# Patient Record
Sex: FEMALE | Race: WHITE | NOT HISPANIC OR LATINO | Employment: OTHER | URBAN - METROPOLITAN AREA
[De-identification: names, ages, dates, MRNs, and addresses within clinical notes are randomized per-mention and may not be internally consistent; named-entity substitution may affect disease eponyms.]

---

## 2018-01-11 ENCOUNTER — SURGERY - HEALTHEAST (OUTPATIENT)
Dept: SURGERY | Facility: HOSPITAL | Age: 73
End: 2018-01-11

## 2018-01-11 ENCOUNTER — ANESTHESIA - HEALTHEAST (OUTPATIENT)
Dept: SURGERY | Facility: HOSPITAL | Age: 73
End: 2018-01-11

## 2018-01-11 ASSESSMENT — MIFFLIN-ST. JEOR
SCORE: 1329.82

## 2018-01-15 ENCOUNTER — RECORDS - HEALTHEAST (OUTPATIENT)
Dept: LAB | Facility: CLINIC | Age: 73
End: 2018-01-15

## 2018-01-15 LAB
ANION GAP SERPL CALCULATED.3IONS-SCNC: 10 MMOL/L (ref 5–18)
BUN SERPL-MCNC: 11 MG/DL (ref 8–28)
CALCIUM SERPL-MCNC: 9.5 MG/DL (ref 8.5–10.5)
CHLORIDE BLD-SCNC: 102 MMOL/L (ref 98–107)
CO2 SERPL-SCNC: 29 MMOL/L (ref 22–31)
CREAT SERPL-MCNC: 0.7 MG/DL (ref 0.6–1.1)
GFR SERPL CREATININE-BSD FRML MDRD: >60 ML/MIN/1.73M2
GLUCOSE BLD-MCNC: 73 MG/DL (ref 70–125)
POTASSIUM BLD-SCNC: 4.8 MMOL/L (ref 3.5–5)
SODIUM SERPL-SCNC: 141 MMOL/L (ref 136–145)

## 2018-01-30 ENCOUNTER — RECORDS - HEALTHEAST (OUTPATIENT)
Dept: LAB | Facility: CLINIC | Age: 73
End: 2018-01-30

## 2018-01-30 LAB
ANION GAP SERPL CALCULATED.3IONS-SCNC: 11 MMOL/L (ref 5–18)
BUN SERPL-MCNC: 15 MG/DL (ref 8–28)
CALCIUM SERPL-MCNC: 9.6 MG/DL (ref 8.5–10.5)
CHLORIDE BLD-SCNC: 104 MMOL/L (ref 98–107)
CO2 SERPL-SCNC: 27 MMOL/L (ref 22–31)
CREAT SERPL-MCNC: 0.83 MG/DL (ref 0.6–1.1)
GFR SERPL CREATININE-BSD FRML MDRD: >60 ML/MIN/1.73M2
GLUCOSE BLD-MCNC: 102 MG/DL (ref 70–125)
POTASSIUM BLD-SCNC: 4.4 MMOL/L (ref 3.5–5)
SODIUM SERPL-SCNC: 142 MMOL/L (ref 136–145)

## 2018-04-05 ENCOUNTER — RECORDS - HEALTHEAST (OUTPATIENT)
Dept: LAB | Facility: CLINIC | Age: 73
End: 2018-04-05

## 2018-04-05 LAB
ANION GAP SERPL CALCULATED.3IONS-SCNC: 14 MMOL/L (ref 5–18)
BUN SERPL-MCNC: 17 MG/DL (ref 8–28)
CALCIUM SERPL-MCNC: 9.9 MG/DL (ref 8.5–10.5)
CHLORIDE BLD-SCNC: 105 MMOL/L (ref 98–107)
CO2 SERPL-SCNC: 23 MMOL/L (ref 22–31)
CREAT SERPL-MCNC: 0.79 MG/DL (ref 0.6–1.1)
GFR SERPL CREATININE-BSD FRML MDRD: >60 ML/MIN/1.73M2
GLUCOSE BLD-MCNC: 78 MG/DL (ref 70–125)
POTASSIUM BLD-SCNC: 4.9 MMOL/L (ref 3.5–5)
SODIUM SERPL-SCNC: 142 MMOL/L (ref 136–145)

## 2018-04-13 ENCOUNTER — SURGERY - HEALTHEAST (OUTPATIENT)
Dept: SURGERY | Facility: HOSPITAL | Age: 73
End: 2018-04-13

## 2018-04-13 ENCOUNTER — ANESTHESIA - HEALTHEAST (OUTPATIENT)
Dept: SURGERY | Facility: HOSPITAL | Age: 73
End: 2018-04-13

## 2018-04-13 ASSESSMENT — MIFFLIN-ST. JEOR: SCORE: 1334.35

## 2018-05-01 ENCOUNTER — RECORDS - HEALTHEAST (OUTPATIENT)
Dept: LAB | Facility: CLINIC | Age: 73
End: 2018-05-01

## 2018-05-01 LAB — CANCER AG125 SERPL-ACNC: 40.3 U/ML (ref 0–35)

## 2018-05-09 ENCOUNTER — SURGERY - HEALTHEAST (OUTPATIENT)
Dept: GASTROENTEROLOGY | Facility: CLINIC | Age: 73
End: 2018-05-09

## 2018-05-09 ASSESSMENT — MIFFLIN-ST. JEOR: SCORE: 1338.89

## 2018-06-04 ENCOUNTER — RECORDS - HEALTHEAST (OUTPATIENT)
Dept: LAB | Facility: CLINIC | Age: 73
End: 2018-06-04

## 2018-06-05 LAB — CANCER AG125 SERPL-ACNC: 24.5 U/ML (ref 0–35)

## 2018-07-03 ASSESSMENT — MIFFLIN-ST. JEOR: SCORE: 1352.5

## 2018-07-04 ENCOUNTER — ANESTHESIA - HEALTHEAST (OUTPATIENT)
Dept: SURGERY | Facility: HOSPITAL | Age: 73
End: 2018-07-04

## 2018-07-05 ENCOUNTER — SURGERY - HEALTHEAST (OUTPATIENT)
Dept: SURGERY | Facility: HOSPITAL | Age: 73
End: 2018-07-05

## 2018-07-05 ASSESSMENT — MIFFLIN-ST. JEOR: SCORE: 1365.89

## 2018-07-06 ASSESSMENT — MIFFLIN-ST. JEOR: SCORE: 1404.44

## 2018-08-02 ENCOUNTER — RECORDS - HEALTHEAST (OUTPATIENT)
Dept: LAB | Facility: CLINIC | Age: 73
End: 2018-08-02

## 2018-08-02 LAB — CEA SERPL-MCNC: 2.3 NG/ML (ref 0–3)

## 2018-09-10 ENCOUNTER — RECORDS - HEALTHEAST (OUTPATIENT)
Dept: LAB | Facility: CLINIC | Age: 73
End: 2018-09-10

## 2018-09-10 LAB
ALBUMIN SERPL-MCNC: 3.2 G/DL (ref 3.5–5)
ANION GAP SERPL CALCULATED.3IONS-SCNC: 13 MMOL/L (ref 5–18)
BUN SERPL-MCNC: 18 MG/DL (ref 8–28)
CALCIUM SERPL-MCNC: 9.7 MG/DL (ref 8.5–10.5)
CHLORIDE BLD-SCNC: 104 MMOL/L (ref 98–107)
CO2 SERPL-SCNC: 21 MMOL/L (ref 22–31)
CREAT SERPL-MCNC: 0.77 MG/DL (ref 0.6–1.1)
GFR SERPL CREATININE-BSD FRML MDRD: >60 ML/MIN/1.73M2
GLUCOSE BLD-MCNC: 85 MG/DL (ref 70–125)
PHOSPHATE SERPL-MCNC: 3.4 MG/DL (ref 2.5–4.5)
POTASSIUM BLD-SCNC: 5.4 MMOL/L (ref 3.5–5)
SODIUM SERPL-SCNC: 138 MMOL/L (ref 136–145)

## 2019-02-07 ENCOUNTER — RECORDS - HEALTHEAST (OUTPATIENT)
Dept: LAB | Facility: CLINIC | Age: 74
End: 2019-02-07

## 2019-02-07 LAB
ANION GAP SERPL CALCULATED.3IONS-SCNC: 10 MMOL/L (ref 5–18)
BUN SERPL-MCNC: 19 MG/DL (ref 8–28)
CALCIUM SERPL-MCNC: 9.8 MG/DL (ref 8.5–10.5)
CHLORIDE BLD-SCNC: 107 MMOL/L (ref 98–107)
CHOLEST SERPL-MCNC: 216 MG/DL
CO2 SERPL-SCNC: 26 MMOL/L (ref 22–31)
CREAT SERPL-MCNC: 0.83 MG/DL (ref 0.6–1.1)
FASTING STATUS PATIENT QL REPORTED: ABNORMAL
GFR SERPL CREATININE-BSD FRML MDRD: >60 ML/MIN/1.73M2
GLUCOSE BLD-MCNC: 85 MG/DL (ref 70–125)
HDLC SERPL-MCNC: 75 MG/DL
LDLC SERPL CALC-MCNC: 95 MG/DL
POTASSIUM BLD-SCNC: 4.9 MMOL/L (ref 3.5–5)
SODIUM SERPL-SCNC: 143 MMOL/L (ref 136–145)
TRIGL SERPL-MCNC: 230 MG/DL

## 2019-04-09 ENCOUNTER — RECORDS - HEALTHEAST (OUTPATIENT)
Dept: LAB | Facility: CLINIC | Age: 74
End: 2019-04-09

## 2019-04-12 LAB — BACTERIA SPEC CULT: ABNORMAL

## 2020-10-08 PROBLEM — E66.9 OBESITY: Status: ACTIVE | Noted: 2020-10-08

## 2020-10-08 PROBLEM — G43.109 MIGRAINE WITH AURA: Status: ACTIVE | Noted: 2020-10-08

## 2020-10-08 PROBLEM — R55 SYNCOPE: Status: ACTIVE | Noted: 2020-10-08

## 2020-10-08 PROBLEM — H53.15 METAMORPHOPSIA: Status: ACTIVE | Noted: 2020-10-08

## 2021-05-31 VITALS — BODY MASS INDEX: 26.37 KG/M2 | HEIGHT: 68 IN | WEIGHT: 174 LBS

## 2021-06-01 VITALS — HEIGHT: 68 IN | WEIGHT: 176 LBS | BODY MASS INDEX: 26.67 KG/M2

## 2021-06-01 VITALS — HEIGHT: 68 IN | BODY MASS INDEX: 26.52 KG/M2 | WEIGHT: 175 LBS

## 2021-06-01 VITALS — WEIGHT: 192.2 LBS | BODY MASS INDEX: 30.17 KG/M2 | HEIGHT: 67 IN

## 2021-06-02 ENCOUNTER — RECORDS - HEALTHEAST (OUTPATIENT)
Dept: ADMINISTRATIVE | Facility: CLINIC | Age: 76
End: 2021-06-02

## 2021-06-15 NOTE — ANESTHESIA PREPROCEDURE EVALUATION
Anesthesia Evaluation      Patient summary reviewed     Airway   Mallampati: II  Neck ROM: full   Pulmonary - negative ROS and normal exam                          Cardiovascular - negative ROS and normal exam   Neuro/Psych - negative ROS     Endo/Other - negative ROS      GI/Hepatic/Renal - negative ROS   (+) GERD intermittent,             Dental    (+) caps    Comment: L upper                       Anesthesia Plan  Planned anesthetic: general endotracheal    ASA 2   Induction: intravenous   Anesthetic plan and risks discussed with: patient    Post-op plan: routine recovery

## 2021-06-15 NOTE — ANESTHESIA CARE TRANSFER NOTE
Last vitals:   Vitals:    01/11/18 0840   BP: 155/89   Pulse: (!) 109   Resp: 16   Temp: 37.1  C (98.7  F)   SpO2: 98%     Patient's level of consciousness is drowsy  Spontaneous respirations: yes  Maintains airway independently: yes  Dentition unchanged: yes  Oropharynx: oropharynx clear of all foreign objects    QCDR Measures:  ASA# 20 - Surgical Safety Checklist: WHO surgical safety checklist completed prior to induction  PQRS# 430 - Adult PONV Prevention: 4558F - Pt received => 2 anti-emetic agents (different classes) preop & intraop  ASA# 8 - Peds PONV Prevention: NA - Not pediatric patient, not GA or 2 or more risk factors NOT present  PQRS# 424 - Amina-op Temp Management: 4559F - At least one body temp DOCUMENTED => 35.5C or 95.9F within required timeframe  PQRS# 426 - PACU Transfer Protocol: - Transfer of care checklist used  ASA# 14 - Acute Post-op Pain: ASA14B - Patient did NOT experience pain >= 7 out of 10.  Patient denied pain upon arrival to PACU.

## 2021-06-15 NOTE — ANESTHESIA POSTPROCEDURE EVALUATION
Patient: Natalie Onofre  APPENDECTOMY, LAPAROSCOPIC  Anesthesia type: general    Patient location:       Last vitals:   Vitals:    01/11/18 1026   BP: 133/59   Pulse: 76   Resp: 12   Temp: 36.5  C (97.7  F)   SpO2: 96%     Post vital signs: stable  Level of consciousness: awake and responds to simple questions  Post-anesthesia pain: pain controlled  Post-anesthesia nausea and vomiting: no  Pulmonary: unassisted, return to baseline  Cardiovascular: stable and blood pressure at baseline  Hydration: adequate  Anesthetic events: no    QCDR Measures:  ASA# 11 - Amina-op Cardiac Arrest: ASA11B - Patient did NOT experience unanticipated cardiac arrest  ASA# 12 - Amina-op Mortality Rate: ASA12B - Patient did NOT die  ASA# 13 - PACU Re-Intubation Rate: ASA13B - Patient did NOT require a new airway mgmt  ASA# 10 - Composite Anes Safety: ASA10A - No serious adverse event    Additional Notes:

## 2021-06-17 NOTE — ANESTHESIA POSTPROCEDURE EVALUATION
Patient: Natalie Onofre  LAPAROSCOPY RIGHT SALPINGO-OOPHERECTOMY, HYSTEROSCOPY, DILATION AND CURETTAGE  Anesthesia type: general    Patient location: PACU  Last vitals:   Vitals:    04/13/18 1355   BP: 154/86   Pulse: 96   Resp: 16   Temp: 36.7  C (98.1  F)   SpO2: 100%     Post vital signs: stable  Level of consciousness: awake and responds to simple questions  Post-anesthesia pain: pain controlled  Post-anesthesia nausea and vomiting: no  Pulmonary: unassisted, return to baseline  Cardiovascular: stable and blood pressure at baseline  Hydration: adequate  Anesthetic events: no    QCDR Measures:  ASA# 11 - Amina-op Cardiac Arrest: ASA11B - Patient did NOT experience unanticipated cardiac arrest  ASA# 12 - Amina-op Mortality Rate: ASA12B - Patient did NOT die  ASA# 13 - PACU Re-Intubation Rate: ASA13B - Patient did NOT require a new airway mgmt  ASA# 10 - Composite Anes Safety: ASA10A - No serious adverse event    Additional Notes:

## 2021-06-17 NOTE — ANESTHESIA PREPROCEDURE EVALUATION
Anesthesia Evaluation      Patient summary reviewed   No history of anesthetic complications     Airway   Mallampati: II  Neck ROM: full   Pulmonary - normal exam    breath sounds clear to auscultation  (-) shortness of breath, sleep apnea, not a smoker                         Cardiovascular   Exercise tolerance: > or = 4 METS  (+) , hypercholesterolemia,     (-) angina, murmur  ECG reviewed  Rhythm: regular  Rate: normal,    no murmur      Neuro/Psych    (+) depression, anxiety/panic attacks,     Comments: Post concussion syndrome    Endo/Other    (+) obesity,   (-) not pregnant     GI/Hepatic/Renal    (+) GERD,             Dental - normal exam   (+) caps                       Anesthesia Plan  Planned anesthetic: general endotracheal    ASA 2   Induction: intravenous   Anesthetic plan and risks discussed with: patient and spouse  Anesthesia plan special considerations: antiemetics,   Post-op plan: routine recovery

## 2021-06-17 NOTE — ANESTHESIA CARE TRANSFER NOTE
Last vitals:   Vitals:    04/13/18 1355   BP: 154/86   Pulse: 96   Resp: 16   Temp: 36.7  C (98.1  F)   SpO2: 100%   In OR, spont respir, , REDDY, sustained head lift, sx and extubated to 02 via FM, spont respir, transported to PACU, VSS, report to RN.   Patient's level of consciousness is drowsy  Spontaneous respirations: yes  Maintains airway independently: yes  Dentition unchanged: yes  Oropharynx: oropharynx clear of all foreign objects    QCDR Measures:  ASA# 20 - Surgical Safety Checklist: WHO surgical safety checklist completed prior to induction  PQRS# 430 - Adult PONV Prevention: 4558F - Pt received => 2 anti-emetic agents (different classes) preop & intraop  ASA# 8 - Peds PONV Prevention: NA - Not pediatric patient, not GA or 2 or more risk factors NOT present  PQRS# 424 - Amina-op Temp Management: 4559F - At least one body temp DOCUMENTED => 35.5C or 95.9F within required timeframe  PQRS# 426 - PACU Transfer Protocol: - Transfer of care checklist used  ASA# 14 - Acute Post-op Pain: ASA14B - Patient did NOT experience pain >= 7 out of 10

## 2021-06-19 NOTE — ANESTHESIA PREPROCEDURE EVALUATION
"Anesthesia Evaluation      Patient summary reviewed   History of anesthetic complications (Hx dizziness with anesthesia.)     Airway   Mallampati: II  Neck ROM: full   Pulmonary - normal exam     ROS comment: Seasonal allergies                         Cardiovascular - negative ROS and normal exam  Exercise tolerance: > or = 4 METS  (+) , hypercholesterolemia,     ECG reviewed        Neuro/Psych    (+) depression, anxiety/panic attacks, chronic pain (Chronic neck pain)  (-) no seizures, no CVA    Comments: Migaine HA's    Endo/Other    (-) no diabetes     Comments: Osteopenia    GI/Hepatic/Renal    (+) GERD,     (-) impaired hepatic function, renal disease     Other findings: Hgb 12.6      S/p concussion with post-concussion syndrome 2 years ago. Intermittent dizziness. Double vision essentially resolved. Patient is on vision therapy. Balance issues.      Dental    (+) caps    Comment: Cap top front tooth                       Anesthesia Plan  Planned anesthetic: general endotracheal  Soft molar bite block to protect upper front cap  Scopolamine patch  Acetaminophen 1 Gm IVPB intraop  Minimize narcotics  Glidescope intubation for chronic neck pain  Modified RSI with Zemuron  Propofol 50 mcg/kg/min IV with Sevo.  Zofran/Decadron  Toradol 15 mg IV if \"ok\" with Dr. Man  Sugammadex reversal  ASA 3   Induction: intravenous   Anesthetic plan and risks discussed with: patient and spouse  Anesthesia plan special considerations: video-assisted, rapid sequence induction, antiemetics,   Post-op plan: routine recovery          "

## 2021-06-19 NOTE — ANESTHESIA POSTPROCEDURE EVALUATION
Patient: Natalie Onofre  LAPAROSCOPY LEFT SALPINGO OOPHORECTOMY   Anesthesia type: general    Patient location: PACU  Last vitals:   Vitals:    07/05/18 1230   BP: 133/76   Pulse: 84   Resp: 20   Temp: 36.8  C (98.3  F)   SpO2: 94%     Post vital signs: stable  Level of consciousness: awake and responds to simple questions  Post-anesthesia pain: pain controlled  Post-anesthesia nausea and vomiting: no  Pulmonary: unassisted, return to baseline  Cardiovascular: stable and blood pressure at baseline  Hydration: adequate  Anesthetic events: no    QCDR Measures:  ASA# 11 - Amina-op Cardiac Arrest: ASA11B - Patient did NOT experience unanticipated cardiac arrest  ASA# 12 - Amina-op Mortality Rate: ASA12B - Patient did NOT die  ASA# 13 - PACU Re-Intubation Rate: ASA13B - Patient did NOT require a new airway mgmt  ASA# 10 - Composite Anes Safety: ASA10A - No serious adverse event    Additional Notes:

## 2021-06-19 NOTE — ANESTHESIA CARE TRANSFER NOTE
Last vitals:   Vitals:    07/05/18 0924   BP: (!) 145/91   Pulse: (!) 103   Resp: 12   Temp: 36.3  C (97.4  F)   SpO2: 100%     Patient's level of consciousness is drowsy  Spontaneous respirations: yes  Maintains airway independently: yes  Dentition unchanged: yes  Oropharynx: oropharynx clear of all foreign objects    QCDR Measures:  ASA# 20 - Surgical Safety Checklist: WHO surgical safety checklist completed prior to induction  PQRS# 430 - Adult PONV Prevention: 4558F - Pt received => 2 anti-emetic agents (different classes) preop & intraop  ASA# 8 - Peds PONV Prevention: NA - Not pediatric patient, not GA or 2 or more risk factors NOT present  PQRS# 424 - Amina-op Temp Management: 4559F - At least one body temp DOCUMENTED => 35.5C or 95.9F within required timeframe  PQRS# 426 - PACU Transfer Protocol: - Transfer of care checklist used  ASA# 14 - Acute Post-op Pain: ASA14B - Patient did NOT experience pain >= 7 out of 10

## 2021-07-19 ENCOUNTER — LAB REQUISITION (OUTPATIENT)
Dept: LAB | Facility: CLINIC | Age: 76
End: 2021-07-19
Payer: COMMERCIAL

## 2021-07-19 DIAGNOSIS — E78.2 MIXED HYPERLIPIDEMIA: ICD-10-CM

## 2021-07-19 LAB
CHOLEST SERPL-MCNC: 205 MG/DL
FASTING STATUS PATIENT QL REPORTED: ABNORMAL
HDLC SERPL-MCNC: 83 MG/DL
LDLC SERPL CALC-MCNC: 78 MG/DL
TRIGL SERPL-MCNC: 219 MG/DL

## 2021-07-19 PROCEDURE — 80061 LIPID PANEL: CPT | Mod: ORL | Performed by: FAMILY MEDICINE

## 2021-09-01 DIAGNOSIS — G43.109 MIGRAINE WITH AURA AND WITHOUT STATUS MIGRAINOSUS, NOT INTRACTABLE: ICD-10-CM

## 2021-09-01 RX ORDER — DIVALPROEX SODIUM 250 MG/1
250 TABLET, EXTENDED RELEASE ORAL AT BEDTIME
Qty: 30 TABLET | Refills: 0 | Status: SHIPPED | OUTPATIENT
Start: 2021-09-01 | End: 2021-09-21

## 2021-09-01 NOTE — LETTER
9/1/2021        RE: Natalie Onofre  [address] [address]              Dear Natalie,          We recently provided you with medication refills.  Many medications require routine follow-up with your doctor.    Your prescription(s) have been refilled for 30 days so you may have time for the above noted follow-up. Please call to schedule soon so we can assure you have an appointment before your next refills are needed. If you have already made a follow up appointment, please disregard this letter.           Sincerely,        [facility]     (Formerly known as [facility])  Dr. Duran Care team

## 2021-09-01 NOTE — TELEPHONE ENCOUNTER
Refill request for divalproex.  Last seen 5/7/2019  Letter mailed to pt reminding to schedule.  Medication T'd for review and signature    Shabana Arce LPN on 9/1/2021 at 1:44 PM

## 2021-09-21 RX ORDER — DIVALPROEX SODIUM 250 MG/1
250 TABLET, EXTENDED RELEASE ORAL AT BEDTIME
Qty: 90 TABLET | Refills: 1 | Status: SHIPPED | OUTPATIENT
Start: 2021-09-21 | End: 2022-01-11

## 2021-09-21 NOTE — TELEPHONE ENCOUNTER
Refill request for divalproex.  Pt has upcoming appt on 1/11/22.  Medication T'd for review and signature    Shabana Arce LPN on 9/21/2021 at 12:03 PM

## 2021-09-21 NOTE — TELEPHONE ENCOUNTER
Left message for pt that refill was sent to the pharmacy.    Shabana Arce LPN on 9/21/2021 at 3:40 PM

## 2021-09-21 NOTE — TELEPHONE ENCOUNTER
Gaby did schedule for January 2022 with . So she will need refills of her Depakote until then. Uses [facility] in [place].  Also she wanted  to know she has missed the last few appointments or has not been in lately due to vision issues she has been having and is having surgery for that mid October. No need to call her back she just wanted us to know why.

## 2022-01-11 ENCOUNTER — OFFICE VISIT (OUTPATIENT)
Dept: NEUROLOGY | Facility: CLINIC | Age: 77
End: 2022-01-11
Payer: MEDICARE

## 2022-01-11 VITALS
HEART RATE: 85 BPM | BODY MASS INDEX: 26.67 KG/M2 | DIASTOLIC BLOOD PRESSURE: 65 MMHG | HEIGHT: 68 IN | SYSTOLIC BLOOD PRESSURE: 107 MMHG | WEIGHT: 176 LBS

## 2022-01-11 DIAGNOSIS — G43.109 MIGRAINE WITH AURA AND WITHOUT STATUS MIGRAINOSUS, NOT INTRACTABLE: Primary | ICD-10-CM

## 2022-01-11 PROCEDURE — 99215 OFFICE O/P EST HI 40 MIN: CPT | Performed by: PSYCHIATRY & NEUROLOGY

## 2022-01-11 RX ORDER — DIVALPROEX SODIUM 250 MG/1
250 TABLET, EXTENDED RELEASE ORAL AT BEDTIME
Qty: 90 TABLET | Refills: 3 | Status: SHIPPED | OUTPATIENT
Start: 2022-01-11 | End: 2023-01-12

## 2022-01-11 RX ORDER — DULOXETIN HYDROCHLORIDE 30 MG/1
1 CAPSULE, DELAYED RELEASE ORAL DAILY
COMMUNITY

## 2022-01-11 RX ORDER — ATORVASTATIN CALCIUM 40 MG/1
TABLET, FILM COATED ORAL
COMMUNITY

## 2022-01-11 RX ORDER — SUMATRIPTAN 100 MG/1
100 TABLET, FILM COATED ORAL PRN
COMMUNITY
End: 2024-03-04

## 2022-01-11 RX ORDER — ASPIRIN 325 MG
TABLET ORAL
COMMUNITY
End: 2024-03-04

## 2022-01-11 RX ORDER — CHOLECALCIFEROL (VITAMIN D3) 25 MCG
CAPSULE ORAL
COMMUNITY

## 2022-01-11 RX ORDER — MIRTAZAPINE 15 MG/1
15 TABLET, FILM COATED ORAL AT BEDTIME
COMMUNITY

## 2022-01-11 RX ORDER — PANTOPRAZOLE SODIUM 20 MG/1
20 TABLET, DELAYED RELEASE ORAL
COMMUNITY

## 2022-01-11 RX ORDER — MULTIVITAMIN,THERAPEUTIC
1 TABLET ORAL DAILY
COMMUNITY

## 2022-01-11 RX ORDER — CETIRIZINE HYDROCHLORIDE 10 MG/1
10 TABLET ORAL
COMMUNITY

## 2022-01-11 RX ORDER — FLUTICASONE PROPIONATE 50 MCG
1 SPRAY, SUSPENSION (ML) NASAL
COMMUNITY

## 2022-01-11 RX ORDER — DULOXETIN HYDROCHLORIDE 60 MG/1
1 CAPSULE, DELAYED RELEASE ORAL DAILY
COMMUNITY

## 2022-01-11 ASSESSMENT — MIFFLIN-ST. JEOR: SCORE: 1333.89

## 2022-01-11 NOTE — LETTER
1/11/2022         RE: Natalie Onofre  [address] [address]        Dear Colleague,    Thank you for referring your patient, Natalie Onofre, to the [facility] [place]. Please see a copy of my visit note below.    In person evaluation      HPI  5/7/2019, in person visit  1/11/2022, in person visit    76-year-old evaluated neurologically for:  Migraine headaches    Last seen almost 3 years ago  No major hospitalizations  No illnesses  No COVID  COVID-vaccine plus booster  Did get some laser treatment of her eyes due to a film that presented after her cataract surgery        A.  Migraine headaches       Been treated since July 1994       Patient does have past history of motion sickness when she was young       History of neck pain and headaches prior to the severe ones that brought her in for diagnosis       Can be aggravated by some insomnia and chronic neck pain         Frequency of headache about 3/month       Duration 3-4 hours       Better with 1 tablet of Excedrin Migraine uses sparingly    B.  Past history with concussion after fall April 2016       Fall April 2016 positive loss of consciousness difficulty with vision after the event      C.   Chronic neck pain         No radicular component         No new bowel or bladder difficulty         No weakness in the arms or legs or ataxia        Past medical history  Migraine headaches  Hyperlipidemia  Chronic neck pain  Osteopenia  Insomnia  Small bowel obstruction September 2018  GERD/esophagitis  Status post total hysterectomy  Seasonal allergies  Concussion April 2016 positive loss of consciousness visual changes   Multiple surgeries 2018  1. Appendectomy, maybe in January 2018.  2. April 2018, right ovary removed.  3. July 2018, left ovary removed.  4. September 2018, hysterectomy, all done at the [facility], possibly with some cancer.        Habits  Does not smoke      Family history  Adopted  Does not  know who her older sister is and she does not have migraines    Work-up review  EEG in the past normal.  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  TSH 2.21 and a B12 of 731.  Past HDL of 73, LDL of 91, sedimentation rate unremarkable in 2014 at 27, repeated was 28.      Review of systems  Headaches as above    No diplopia dysarthria dysphagia  No visual changes    No focal weakness numbness or tingling  No ataxia  No bowel or bladder difficulty    Does have neck pain  No radicular component    No chest pain or shortness of breath no nausea vomiting diarrhea fever chills    Otherwise review of systems good      General exam  Alert orient x3  Blood pressure 107/65, pulse 85  Lungs clear  Heart rate regular  Abdomen soft  Symmetrical pulses  No edema the feet    Cranial nerves II through XII normal  No ophthalmoplegia  No nystagmus  Visual fields intact  Face symmetrical  Tongue twisters good    Upper extremities  No drift no tremor    Lower extremities  Distal proximal strength good    Gait  Normal    Reflexes decreased throughout symmetrical  No spasticity      Assessment/plan    1.   Migraine headaches        Depakote 250 mg once at nighttime        Doing well        Discussed good headache hygiene        Abortive therapy Excedrin 1 tablet does not overuse    2.  Chronic neck pain       No myelopathic changes       Worse in the morning sometimes better after she gets up and moves around    3.  Past history of concussion get some vestibular/visual therapy still    4.  Cataract surgery had secondary film develop on her eyes and laser surgery fall 2021      We will check some laboratory data to make sure there are side effects of medication  AST  Electrolytes  CBC with platelets    Follow-up at least on a yearly basis  Continue med as it has been helping a lot    Reevaluation last seen almost 3 years ago  Total care time today 42  minutes evaluating discussing the above.                  Again, thank you for allowing me to participate in the care of your patient.        Sincerely,        Justice Duran MD

## 2022-01-11 NOTE — PROGRESS NOTES
In person evaluation      HPI  5/7/2019, in person visit  1/11/2022, in person visit    76-year-old evaluated neurologically for:  Migraine headaches    Last seen almost 3 years ago  No major hospitalizations  No illnesses  No COVID  COVID-vaccine plus booster  Did get some laser treatment of her eyes due to a film that presented after her cataract surgery        A.  Migraine headaches       Been treated since July 1994       Patient does have past history of motion sickness when she was young       History of neck pain and headaches prior to the severe ones that brought her in for diagnosis       Can be aggravated by some insomnia and chronic neck pain         Frequency of headache about 3/month       Duration 3-4 hours       Better with 1 tablet of Excedrin Migraine uses sparingly    B.  Past history with concussion after fall April 2016       Fall April 2016 positive loss of consciousness difficulty with vision after the event      C.   Chronic neck pain         No radicular component         No new bowel or bladder difficulty         No weakness in the arms or legs or ataxia        Past medical history  Migraine headaches  Hyperlipidemia  Chronic neck pain  Osteopenia  Insomnia  Small bowel obstruction September 2018  GERD/esophagitis  Status post total hysterectomy  Seasonal allergies  Concussion April 2016 positive loss of consciousness visual changes   Multiple surgeries 2018  1. Appendectomy, maybe in January 2018.  2. April 2018, right ovary removed.  3. July 2018, left ovary removed.  4. September 2018, hysterectomy, all done at the [facility], possibly with some cancer.        Habits  Does not smoke      Family history  Adopted  Does not know who her older sister is and she does not have migraines    Work-up review  EEG in the past normal.  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  TSH 2.21 and a B12 of 731.  Past HDL  of 73, LDL of 91, sedimentation rate unremarkable in 2014 at 27, repeated was 28.    Laboratory review                     1/18/2022  NA/K             139/5.0    BUN/Cr          17/0.81    AST                 20    GLU                 92    WBC/HGB    9.3/13.8    PLTs            390,000      Review of systems  Headaches as above    No diplopia dysarthria dysphagia  No visual changes    No focal weakness numbness or tingling  No ataxia  No bowel or bladder difficulty    Does have neck pain  No radicular component    No chest pain or shortness of breath no nausea vomiting diarrhea fever chills    Otherwise review of systems good      General exam  Alert orient x3  Blood pressure 107/65, pulse 85  Lungs clear  Heart rate regular  Abdomen soft  Symmetrical pulses  No edema the feet    Cranial nerves II through XII normal  No ophthalmoplegia  No nystagmus  Visual fields intact  Face symmetrical  Tongue twisters good    Upper extremities  No drift no tremor    Lower extremities  Distal proximal strength good    Gait  Normal    Reflexes decreased throughout symmetrical  No spasticity      Assessment/plan    1.   Migraine headaches        Depakote 250 mg once at nighttime        Doing well        Discussed good headache hygiene        Abortive therapy Excedrin 1 tablet does not overuse    2.  Chronic neck pain       No myelopathic changes       Worse in the morning sometimes better after she gets up and moves around    3.  Past history of concussion get some vestibular/visual therapy still    4.  Cataract surgery had secondary film develop on her eyes and laser surgery fall 2021      We will check some laboratory data to make sure there are side effects of medication  AST  Electrolytes  CBC with platelets    Follow-up at least on a yearly basis  Continue med as it has been helping a lot    Reevaluation last seen almost 3 years ago  Total care time today 42 minutes evaluating discussing the above.    Addendum  1/19/2022  Laboratory data 1/18/2022  Sodium 139 potassium 5.0, BUN 17 creatinine 0.81, glucose 92, AST 20  White blood count 9.3 hemoglobin 13.8, platelets 390,000

## 2022-01-11 NOTE — NURSING NOTE
Chief Complaint   Patient presents with     Migraine     Pt states she is doing well. Pt states she has a stiff neck and feels the headaches are related to this. Feels headaches are not too bad.     Shabana Arce LPN on 1/11/2022 at 1:41 PM

## 2022-01-11 NOTE — LETTER
January 19, 2022      Natalie Onofre  [address] [address]        Dear ,    We are writing to inform you of your test results.    Laboratory data 1/18/2022  Sodium 139 potassium 5.0, BUN 17 creatinine 0.81, glucose 92, AST 20  White blood count 9.3 hemoglobin 13.8, platelets 390,000  Your laboratory data look okay continue as planned  If you have any questions or concerns, please call the clinic at the number listed above.     Sincerely,    Dr. Justice Duran

## 2022-01-18 ENCOUNTER — LAB (OUTPATIENT)
Dept: LAB | Facility: HOSPITAL | Age: 77
End: 2022-01-18
Payer: MEDICARE

## 2022-01-18 DIAGNOSIS — G43.109 MIGRAINE WITH AURA AND WITHOUT STATUS MIGRAINOSUS, NOT INTRACTABLE: ICD-10-CM

## 2022-01-18 LAB
ANION GAP SERPL CALCULATED.3IONS-SCNC: 10 MMOL/L (ref 5–18)
AST SERPL W P-5'-P-CCNC: 20 U/L (ref 0–40)
BASOPHILS # BLD AUTO: 0 10E3/UL (ref 0–0.2)
BASOPHILS NFR BLD AUTO: 0 %
BUN SERPL-MCNC: 17 MG/DL (ref 8–28)
CALCIUM SERPL-MCNC: 10 MG/DL (ref 8.5–10.5)
CHLORIDE BLD-SCNC: 103 MMOL/L (ref 98–107)
CO2 SERPL-SCNC: 26 MMOL/L (ref 22–31)
CREAT SERPL-MCNC: 0.81 MG/DL (ref 0.6–1.1)
EOSINOPHIL # BLD AUTO: 0.1 10E3/UL (ref 0–0.7)
EOSINOPHIL NFR BLD AUTO: 1 %
ERYTHROCYTE [DISTWIDTH] IN BLOOD BY AUTOMATED COUNT: 12.9 % (ref 10–15)
GFR SERPL CREATININE-BSD FRML MDRD: 75 ML/MIN/1.73M2
GLUCOSE BLD-MCNC: 92 MG/DL (ref 70–125)
HCT VFR BLD AUTO: 43.2 % (ref 35–47)
HGB BLD-MCNC: 13.8 G/DL (ref 11.7–15.7)
IMM GRANULOCYTES # BLD: 0 10E3/UL
IMM GRANULOCYTES NFR BLD: 0 %
LYMPHOCYTES # BLD AUTO: 3.3 10E3/UL (ref 0.8–5.3)
LYMPHOCYTES NFR BLD AUTO: 36 %
MCH RBC QN AUTO: 30.7 PG (ref 26.5–33)
MCHC RBC AUTO-ENTMCNC: 31.9 G/DL (ref 31.5–36.5)
MCV RBC AUTO: 96 FL (ref 78–100)
MONOCYTES # BLD AUTO: 0.7 10E3/UL (ref 0–1.3)
MONOCYTES NFR BLD AUTO: 7 %
NEUTROPHILS # BLD AUTO: 5.1 10E3/UL (ref 1.6–8.3)
NEUTROPHILS NFR BLD AUTO: 56 %
NRBC # BLD AUTO: 0 10E3/UL
NRBC BLD AUTO-RTO: 0 /100
PLATELET # BLD AUTO: 390 10E3/UL (ref 150–450)
POTASSIUM BLD-SCNC: 5 MMOL/L (ref 3.5–5)
RBC # BLD AUTO: 4.49 10E6/UL (ref 3.8–5.2)
SODIUM SERPL-SCNC: 139 MMOL/L (ref 136–145)
WBC # BLD AUTO: 9.3 10E3/UL (ref 4–11)

## 2022-01-18 PROCEDURE — 82374 ASSAY BLOOD CARBON DIOXIDE: CPT

## 2022-01-18 PROCEDURE — 84450 TRANSFERASE (AST) (SGOT): CPT

## 2022-01-18 PROCEDURE — 85004 AUTOMATED DIFF WBC COUNT: CPT

## 2022-01-18 PROCEDURE — 36415 COLL VENOUS BLD VENIPUNCTURE: CPT

## 2022-12-12 ENCOUNTER — LAB REQUISITION (OUTPATIENT)
Dept: LAB | Facility: CLINIC | Age: 77
End: 2022-12-12
Payer: MEDICARE

## 2022-12-12 DIAGNOSIS — E78.2 MIXED HYPERLIPIDEMIA: ICD-10-CM

## 2022-12-12 LAB
ALBUMIN SERPL BCG-MCNC: 4 G/DL (ref 3.5–5.2)
ALP SERPL-CCNC: 117 U/L (ref 35–104)
ALT SERPL W P-5'-P-CCNC: 14 U/L (ref 10–35)
ANION GAP SERPL CALCULATED.3IONS-SCNC: 13 MMOL/L (ref 7–15)
AST SERPL W P-5'-P-CCNC: 21 U/L (ref 10–35)
BILIRUB SERPL-MCNC: 0.2 MG/DL
BUN SERPL-MCNC: 17.4 MG/DL (ref 8–23)
CALCIUM SERPL-MCNC: 9.7 MG/DL (ref 8.8–10.2)
CHLORIDE SERPL-SCNC: 102 MMOL/L (ref 98–107)
CHOLEST SERPL-MCNC: 210 MG/DL
CREAT SERPL-MCNC: 0.8 MG/DL (ref 0.51–0.95)
DEPRECATED HCO3 PLAS-SCNC: 23 MMOL/L (ref 22–29)
GFR SERPL CREATININE-BSD FRML MDRD: 76 ML/MIN/1.73M2
GLUCOSE SERPL-MCNC: 90 MG/DL (ref 70–99)
HDLC SERPL-MCNC: 82 MG/DL
LDLC SERPL CALC-MCNC: 87 MG/DL
NONHDLC SERPL-MCNC: 128 MG/DL
POTASSIUM SERPL-SCNC: 5.1 MMOL/L (ref 3.4–5.3)
PROT SERPL-MCNC: 6.7 G/DL (ref 6.4–8.3)
SODIUM SERPL-SCNC: 138 MMOL/L (ref 136–145)
TRIGL SERPL-MCNC: 207 MG/DL

## 2022-12-12 PROCEDURE — 80053 COMPREHEN METABOLIC PANEL: CPT | Mod: ORL | Performed by: FAMILY MEDICINE

## 2022-12-12 PROCEDURE — 80061 LIPID PANEL: CPT | Mod: ORL | Performed by: FAMILY MEDICINE

## 2023-01-11 DIAGNOSIS — G43.109 MIGRAINE WITH AURA AND WITHOUT STATUS MIGRAINOSUS, NOT INTRACTABLE: ICD-10-CM

## 2023-01-12 NOTE — TELEPHONE ENCOUNTER
Refill request for: divalproex ER 250mg  Directions: Take 1 tablet (250 mg) by mouth At Bedtime     LOV: 01/11/22  NOV: 03/03/23    90 day supply with 0 refills Medication T'd for review and signature    Shabana Arce LPN on 1/12/2023 at 10:13 AM'

## 2023-01-13 RX ORDER — DIVALPROEX SODIUM 250 MG/1
250 TABLET, EXTENDED RELEASE ORAL AT BEDTIME
Qty: 90 TABLET | Refills: 0 | Status: SHIPPED | OUTPATIENT
Start: 2023-01-13 | End: 2023-03-03

## 2023-02-01 ENCOUNTER — TRANSCRIBE ORDERS (OUTPATIENT)
Dept: OTHER | Age: 78
End: 2023-02-01

## 2023-02-01 DIAGNOSIS — R05.9 COUGH, UNSPECIFIED TYPE: Primary | ICD-10-CM

## 2023-03-01 NOTE — PROGRESS NOTES
In person evaluation      HPI  5/7/2019, in person visit  1/11/2022, in person visit  3/3/2023, in person visit      77-year-old evaluated neurologically for:  Migraine headaches  Posttraumatic vertigo 2016      Last seen almost 3 years ago  No major hospitalizations  No illnesses  No COVID  COVID-vaccine plus booster  Did get some laser treatment of her eyes due to a film that presented after her cataract surgery    Since last seen doing well with migraine control    6-8 weeks ago developed a mild vertigo  When she is walking sometimes she turns and kind of dips to 1 side a bit can do sidesteps  When she sits down she has this whoo sensation like she is in a swing last for less than a minute    No hearing loss  Once in a while some tinnitus  No recent barotrauma  No pain in the ear  No recent URI  No numbness on the face  No diplopia or dysarthria    Seems to have some flareup of her inner ear dysfunction/vertigo    She will try some over-the-counter meclizine half a tablet at nighttime  If after a week or 2 does she not seem to give it better she could contact the PT people that she has seen before for the positioning treatment  And if still not better should see ENT            A.  Migraine headaches       Been treated since July 1994       Patient does have past history of motion sickness when she was young       History of neck pain and headaches prior to the severe ones that brought her in for diagnosis       Can be aggravated by some insomnia and chronic neck pain                                         1/2022              3/2023       Frequency             3/month            1/month         Duration                3-4 hours           4-6 hours              Better with 1 tablet of Excedrin Migraine uses sparingly      B.  Past history with concussion after fall April 2016       Fall April 2016 positive loss of consciousness difficulty with vision after the event      C.   Chronic neck pain         No radicular  component         No new bowel or bladder difficulty         No weakness in the arms or legs or ataxia        Past medical history  Migraine headaches  Hyperlipidemia  Chronic neck pain  Osteopenia  Insomnia  Small bowel obstruction September 2018  GERD/esophagitis  Status post total hysterectomy  Seasonal allergies  Concussion April 2016 positive loss of consciousness visual changes   Multiple surgeries 2018  1. Appendectomy, maybe in January 2018.  2. April 2018, right ovary removed.  3. July 2018, left ovary removed.  4. September 2018, hysterectomy, all done at the [facility], possibly with some cancer.        Habits  Does not smoke      Family history  Adopted  Does not know who her older sister is and she does not have migraines    Work-up review  EEG in the past normal.  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  TSH 2.21 and a B12 of 731.  Past HDL of 73, LDL of 91, sedimentation rate unremarkable in 2014 at 27, repeated was 28.    Laboratory review                     1/18/2022        12/2022            3/15/2023  NA/K             139/5.0           138/5.1    BUN/Cr          17/0.81          17.4/0.8    AST                 20                 21    GLU                 92                 90    WBC/HGB    9.3/13.8                                 10.8/13.4    PLTs            390,000                                  351,000      HDL/LDL                               82/87        Review of systems  Headaches as above    No diplopia dysarthria dysphagia  No visual changes    No focal weakness numbness or tingling  No ataxia  No bowel or bladder difficulty    Does have neck pain  No radicular component    No chest pain or shortness of breath no nausea vomiting diarrhea fever chills    Some vertigo see above    Otherwise review of systems good      General exam  Alert orient x3  Blood pressure 120/71, pulse 78  Lungs clear  Heart  rate regular  Abdomen soft  Symmetrical pulses  No edema the feet    Cranial nerves II through XII normal  No ophthalmoplegia  No nystagmus  Visual fields intact  Face symmetrical  Tongue twisters good    Upper extremities  No drift no tremor    Lower extremities  Distal proximal strength good    Gait  Normal able to turn multiple times when when out to the sink then down the arboleda and back to the sink and back into the room and stood and had a negative Romberg  Romberg negative      Reflexes decreased throughout symmetrical  No spasticity      Assessment/plan    1.   Migraine headaches        Depakote 250 mg once at nighttime        Doing well        Discussed good headache hygiene        Abortive therapy Excedrin 1 tablet does not overuse    2.  Chronic neck pain       No myelopathic changes       Worse in the morning sometimes better after she gets up and moves around    3.  Past history of concussion get some vestibular/visual therapy still    4.  Cataract surgery had secondary film develop on her eyes and laser surgery fall 2021    5.   Posttraumatic vertigo flared up from the past        Meclizine half a tablet at nighttime if not improving,        Could receive physical therapy for cannula 3 positioning exercises        If still not better then should see ENT      Discussed multiple issues above and recent new difficulty with vertigo  Refilled medications reviewed labs  We will check CBC with platelets to complete lab review    32 minutes total care time today  Follow-up in 1 year        As part of the visit today  Reviewed laboratory data    Addendum 3/15/2023  Laboratory data 3/15/2023  WBC 10.8, hemoglobin 13.4, platelets 351,000

## 2023-03-03 ENCOUNTER — OFFICE VISIT (OUTPATIENT)
Dept: NEUROLOGY | Facility: CLINIC | Age: 78
End: 2023-03-03
Payer: MEDICARE

## 2023-03-03 VITALS
BODY MASS INDEX: 26.98 KG/M2 | SYSTOLIC BLOOD PRESSURE: 120 MMHG | DIASTOLIC BLOOD PRESSURE: 71 MMHG | HEART RATE: 78 BPM | WEIGHT: 178 LBS | HEIGHT: 68 IN

## 2023-03-03 DIAGNOSIS — G43.109 MIGRAINE WITH AURA AND WITHOUT STATUS MIGRAINOSUS, NOT INTRACTABLE: Primary | ICD-10-CM

## 2023-03-03 DIAGNOSIS — H81.11 BENIGN PAROXYSMAL POSITIONAL VERTIGO OF RIGHT EAR: ICD-10-CM

## 2023-03-03 PROCEDURE — 99214 OFFICE O/P EST MOD 30 MIN: CPT | Performed by: PSYCHIATRY & NEUROLOGY

## 2023-03-03 RX ORDER — DIVALPROEX SODIUM 250 MG/1
250 TABLET, EXTENDED RELEASE ORAL AT BEDTIME
Qty: 90 TABLET | Refills: 3 | Status: SHIPPED | OUTPATIENT
Start: 2023-03-03 | End: 2024-03-04

## 2023-03-03 RX ORDER — ARIPIPRAZOLE 5 MG/1
TABLET ORAL
COMMUNITY
Start: 2023-02-21

## 2023-03-03 NOTE — LETTER
3/3/2023         RE: Natalie Onofre  [address] [address]        Dear Colleague,    Thank you for referring your patient, Natalie Onofre, to the [facility] [place]. Please see a copy of my visit note below.    In person evaluation      HPI  5/7/2019, in person visit  1/11/2022, in person visit  3/3/2023, in person visit      77-year-old evaluated neurologically for:  Migraine headaches  Posttraumatic vertigo 2016      Last seen almost 3 years ago  No major hospitalizations  No illnesses  No COVID  COVID-vaccine plus booster  Did get some laser treatment of her eyes due to a film that presented after her cataract surgery    Since last seen doing well with migraine control    6-8 weeks ago developed a mild vertigo  When she is walking sometimes she turns and kind of dips to 1 side a bit can do sidesteps  When she sits down she has this whoo sensation like she is in a swing last for less than a minute    No hearing loss  Once in a while some tinnitus  No recent barotrauma  No pain in the ear  No recent URI  No numbness on the face  No diplopia or dysarthria    Seems to have some flareup of her inner ear dysfunction/vertigo    She will try some over-the-counter meclizine half a tablet at nighttime  If after a week or 2 does she not seem to give it better she could contact the PT people that she has seen before for the positioning treatment  And if still not better should see ENT            A.  Migraine headaches       Been treated since July 1994       Patient does have past history of motion sickness when she was young       History of neck pain and headaches prior to the severe ones that brought her in for diagnosis       Can be aggravated by some insomnia and chronic neck pain                                         1/2022              3/2023       Frequency             3/month            1/month         Duration                3-4 hours           4-6  hours              Better with 1 tablet of Excedrin Migraine uses sparingly      B.  Past history with concussion after fall April 2016       Fall April 2016 positive loss of consciousness difficulty with vision after the event      C.   Chronic neck pain         No radicular component         No new bowel or bladder difficulty         No weakness in the arms or legs or ataxia        Past medical history  Migraine headaches  Hyperlipidemia  Chronic neck pain  Osteopenia  Insomnia  Small bowel obstruction September 2018  GERD/esophagitis  Status post total hysterectomy  Seasonal allergies  Concussion April 2016 positive loss of consciousness visual changes   Multiple surgeries 2018  1. Appendectomy, maybe in January 2018.  2. April 2018, right ovary removed.  3. July 2018, left ovary removed.  4. September 2018, hysterectomy, all done at the [facility], possibly with some cancer.        Habits  Does not smoke      Family history  Adopted  Does not know who her older sister is and she does not have migraines    Work-up review  EEG in the past normal.  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  TSH 2.21 and a B12 of 731.  Past HDL of 73, LDL of 91, sedimentation rate unremarkable in 2014 at 27, repeated was 28.    Laboratory review                     1/18/2022 12/2022  NA/K             139/5.0           138/5.1    BUN/Cr          17/0.81          17.4/0.8    AST                 20                 21    GLU                 92                 90    WBC/HGB    9.3/13.8    PLTs            390,000      HDL/LDL                               82/87        Review of systems  Headaches as above    No diplopia dysarthria dysphagia  No visual changes    No focal weakness numbness or tingling  No ataxia  No bowel or bladder difficulty    Does have neck pain  No radicular component    No chest pain or shortness of breath no nausea vomiting  diarrhea fever chills    Some vertigo see above    Otherwise review of systems good      General exam  Alert orient x3  Blood pressure 120/71, pulse 78  Lungs clear  Heart rate regular  Abdomen soft  Symmetrical pulses  No edema the feet    Cranial nerves II through XII normal  No ophthalmoplegia  No nystagmus  Visual fields intact  Face symmetrical  Tongue twisters good    Upper extremities  No drift no tremor    Lower extremities  Distal proximal strength good    Gait  Normal able to turn multiple times when when out to the sink then down the arboleda and back to the sink and back into the room and stood and had a negative Romberg  Romberg negative      Reflexes decreased throughout symmetrical  No spasticity      Assessment/plan    1.   Migraine headaches        Depakote 250 mg once at nighttime        Doing well        Discussed good headache hygiene        Abortive therapy Excedrin 1 tablet does not overuse    2.  Chronic neck pain       No myelopathic changes       Worse in the morning sometimes better after she gets up and moves around    3.  Past history of concussion get some vestibular/visual therapy still    4.  Cataract surgery had secondary film develop on her eyes and laser surgery fall 2021    5.   Posttraumatic vertigo flared up from the past        Meclizine half a tablet at nighttime if not improving,        Could receive physical therapy for cannula 3 positioning exercises        If still not better then should see ENT      Discussed multiple issues above and recent new difficulty with vertigo  Refilled medications reviewed labs  We will check CBC with platelets to complete lab review    32 minutes total care time today  Follow-up in 1 year        As part of the visit today  Reviewed laboratory data                Again, thank you for allowing me to participate in the care of your patient.        Sincerely,        Justice Duran MD

## 2023-03-03 NOTE — NURSING NOTE
Chief Complaint   Patient presents with     Migraine     Patient reports some balance issues.     Emily Trejo on 3/3/2023 at 12:52 PM

## 2023-03-03 NOTE — LETTER
March 15, 2023      Natalie Onofre  [address] [address]        Dear ,    We are writing to inform you of your test results.    Laboratory data 3/15/2023  WBC 10.8, hemoglobin 13.4, platelets 351,000  Labs above look good continue as planned  Keep follow-up visit 3/4/2024  If you have any questions or concerns, please call the clinic at the number listed above.       Sincerely,      Dr. Justice Duran

## 2023-03-15 ENCOUNTER — HOSPITAL ENCOUNTER (OUTPATIENT)
Dept: RADIOLOGY | Facility: HOSPITAL | Age: 78
Discharge: HOME | End: 2023-03-15
Attending: FAMILY MEDICINE
Payer: MEDICARE

## 2023-03-15 ENCOUNTER — LAB (OUTPATIENT)
Dept: LAB | Facility: HOSPITAL | Age: 78
End: 2023-03-15
Attending: FAMILY MEDICINE
Payer: MEDICARE

## 2023-03-15 ENCOUNTER — HOSPITAL ENCOUNTER (OUTPATIENT)
Dept: SPEECH THERAPY | Facility: HOSPITAL | Age: 78
Discharge: HOME | End: 2023-03-15
Attending: FAMILY MEDICINE
Payer: MEDICARE

## 2023-03-15 DIAGNOSIS — G43.109 MIGRAINE WITH AURA AND WITHOUT STATUS MIGRAINOSUS, NOT INTRACTABLE: ICD-10-CM

## 2023-03-15 DIAGNOSIS — R05.9 COUGH, UNSPECIFIED TYPE: ICD-10-CM

## 2023-03-15 LAB
ERYTHROCYTE [DISTWIDTH] IN BLOOD BY AUTOMATED COUNT: 12.9 % (ref 10–15)
HCT VFR BLD AUTO: 41.5 % (ref 35–47)
HGB BLD-MCNC: 13.4 G/DL (ref 11.7–15.7)
MCH RBC QN AUTO: 30.6 PG (ref 26.5–33)
MCHC RBC AUTO-ENTMCNC: 32.3 G/DL (ref 31.5–36.5)
MCV RBC AUTO: 95 FL (ref 78–100)
PLATELET # BLD AUTO: 351 10E3/UL (ref 150–450)
RBC # BLD AUTO: 4.38 10E6/UL (ref 3.8–5.2)
WBC # BLD AUTO: 10.8 10E3/UL (ref 4–11)

## 2023-03-15 PROCEDURE — 92611 MOTION FLUOROSCOPY/SWALLOW: CPT | Mod: GN

## 2023-03-15 PROCEDURE — 92610 EVALUATE SWALLOWING FUNCTION: CPT | Mod: GN

## 2023-03-15 PROCEDURE — 36415 COLL VENOUS BLD VENIPUNCTURE: CPT

## 2023-03-15 PROCEDURE — 74230 X-RAY XM SWLNG FUNCJ C+: CPT

## 2023-03-15 PROCEDURE — 85027 COMPLETE CBC AUTOMATED: CPT

## 2023-03-15 NOTE — PROGRESS NOTES
Speech-Language Pathology: Clinical Swallow Evaluation and Video Swallow Study       03/15/23 1300   General Information   Type Of Visit Initial   Start Of Care Date 03/15/23   Referring Physician Neli Rodgers MD   Medical Diagnosis Chronic cough, GERD   Pertinent History of Current Problem/OT: Additional Occupational Profile Info Patient is a 77 year old female with hx significant for GERD/esophagitis, SBO (2018), migraine, and vertigo (concussion 2016). She reports chronic cough for several months that can occur without oral intake but does seem to be more frequent when eating/drinking. Denies recent respiratory infections. Reports taking medication for reflux, many years. Does not wake up coughing but does notice occasional bitter/bad taste when waking up.   Respiratory Status Room air   Patient/family Goals Determine etiology of cough.   Clinical Swallow Evaluation   Oral Musculature generally intact   Structural Abnormalities none present   Dentition present and adequate   Mucosal Quality good  (reports occasional dry mouth in the morning.)   Mandibular Strength and Mobility intact   Lingual Strength and Mobility WFL   Velar Elevation intact   Buccal Strength and Mobility intact   Laryngeal Function Throat clear;Swallow;Voicing initiated;Dry swallow palpated   Oral Musculature Comments WNL   Additional Documentation Yes   Additional evaluation(s) completed today Yes   Rationale for completing additional evaluation Further assess pharyngeal swallow and r/o aspiration.   Clinical Swallow Eval: Thin Liquid Texture Trial   Mode of Presentation, Thin Liquids cup   Volume of Liquid or Food Presented x2 sips   Oral Phase of Swallow WFL   Pharyngeal Phase of Swallow intact   Diagnostic Statement No overt s/s aspiration.   VFSS Evaluation   VFSS Additional Documentation Yes   VFSS Eval: Radiology   Radiologist Dr. ROMAN   Views Taken left lateral;A/P   Physical Location of Procedure [facility]   VFSS Eval:  Thin Liquid Texture Trial   Mode of Presentation, Thin Liquid cup;spoon;straw   Order of Presentation 1,2,3,6   Preparatory Phase WFL   Oral Phase, Thin Liquid WFL   Pharyngeal Phase, Thin Liquid WFL   Rosenbek's Penetration Aspiration Scale: Thin Liquid Trial Results 1 - no aspiration, contrast does not enter airway   Diagnostic Statement WNL   VFSS Eval: Soft & Bite Sized   Mode of Presentation spoon   Order of Presentation 4,5   Preparatory Phase WFL   Oral Phase WFL   Pharyngeal Phase WFL   Rosenbek's Penetration Aspiration Scale 1 - no aspiration, contrast does not enter airway   Diagnostic Statement WNL   Esophageal Phase of Swallow   Patient reports or presents with symptoms of esophageal dysphagia Yes   Esophageal sweep performed during today s vidofluoroscopic exam  Please refer to radiologist's report for details;Yes   Swallow Eval: Clinical Impressions   Diet texture recommendations Regular diet;Thin liquids (level 0)   Demonstrates Need for Referral to Another Service   (GI per primary MD discretion for reflux assessment and management.)   Anticipated Discharge Disposition home   Risks and Benefits of Treatment have been explained. Yes   Patient, family and/or staff in agreement with Plan of Care Yes   Clinical Impression Comments Videofluoroscopic Swallow Study completed. Patient had no aspiration and no penetration with any consistency. Oral motor function WNL and oral phase WNL. Tongue base retraction was WNL. Swallow response was WNL and epiglottic inversion was WNL.  No stasis occurred with any consistency . Hyolaryngeal elevation was WNL and hyolaryngeal excursion was WNL. Discussed results and recommendations with pt using images from study. Recommend diet of regular textures and thin liquids. GI per primary MD discretion for reflux assessment and management.   Total Session Time   SLP Eval: oral/pharyngeal swallow function, clinical minutes (22121) 15   SLP Eval: VideoFluoroscopic Swallow  function Minutes (84527) 10

## 2023-12-13 ENCOUNTER — LAB REQUISITION (OUTPATIENT)
Dept: LAB | Facility: CLINIC | Age: 78
End: 2023-12-13
Payer: MEDICARE

## 2023-12-13 DIAGNOSIS — Z13.1 ENCOUNTER FOR SCREENING FOR DIABETES MELLITUS: ICD-10-CM

## 2023-12-13 DIAGNOSIS — E78.2 MIXED HYPERLIPIDEMIA: ICD-10-CM

## 2023-12-13 PROCEDURE — 80048 BASIC METABOLIC PNL TOTAL CA: CPT | Mod: ORL | Performed by: FAMILY MEDICINE

## 2023-12-13 PROCEDURE — 80061 LIPID PANEL: CPT | Mod: ORL | Performed by: FAMILY MEDICINE

## 2023-12-14 LAB
ANION GAP SERPL CALCULATED.3IONS-SCNC: 15 MMOL/L (ref 7–15)
BUN SERPL-MCNC: 13.3 MG/DL (ref 8–23)
CALCIUM SERPL-MCNC: 9.6 MG/DL (ref 8.8–10.2)
CHLORIDE SERPL-SCNC: 106 MMOL/L (ref 98–107)
CHOLEST SERPL-MCNC: 177 MG/DL
CREAT SERPL-MCNC: 0.71 MG/DL (ref 0.51–0.95)
DEPRECATED HCO3 PLAS-SCNC: 20 MMOL/L (ref 22–29)
EGFRCR SERPLBLD CKD-EPI 2021: 87 ML/MIN/1.73M2
FASTING STATUS PATIENT QL REPORTED: ABNORMAL
GLUCOSE SERPL-MCNC: 83 MG/DL (ref 70–99)
HDLC SERPL-MCNC: 82 MG/DL
LDLC SERPL CALC-MCNC: 59 MG/DL
NONHDLC SERPL-MCNC: 95 MG/DL
POTASSIUM SERPL-SCNC: 4.5 MMOL/L (ref 3.4–5.3)
SODIUM SERPL-SCNC: 141 MMOL/L (ref 135–145)
TRIGL SERPL-MCNC: 179 MG/DL

## 2024-03-01 NOTE — PROGRESS NOTES
In person evaluation      HPI  2019, in person visit  2022, in person visit  3/3/2023, in person visit  3/4/2024, in person visit      78-year-old evaluated neurologically for:  Migraine headaches  Posttraumatic vertigo     Since last seen a year ago  No hospitalizations  No surgeries  No major illnesses    Migraine headaches are under good control with low-dose Depakote  Has a little bit of intention tremor    May have had 1 rare migraines since I last saw her  Might of last half a day    Her Imitrex has  so she would like a refill    No significant vertigo which she had a little bit in the past  Since last seen doing well with migraine control      In the past  Had developed a mild vertigo  When she is walking sometimes she turns and kind of dips to 1 side a bit can do sidesteps  When she sits down she has this whoo sensation like she is in a swing last for less than a minute    No hearing loss  Once in a while some tinnitus  No recent barotrauma  No pain in the ear  No recent URI  No numbness on the face  No diplopia or dysarthria    Seems to have some flareup of her inner ear dysfunction/vertigo    She will try some over-the-counter meclizine half a tablet at nighttime  If after a week or 2 does she not seem to give it better she could contact the PT people that she has seen before for the positioning treatment  And if still not better should see ENT            A.  Migraine headaches       Been treated since 1994       Patient does have past history of motion sickness when she was young       History of neck pain and headaches prior to the severe ones that brought her in for diagnosis       Can be aggravated by some insomnia and chronic neck pain                                  2022           3/2023         3/2024       Frequency     3/month        1/month         rare                                                                        1/year       Duration        3-4 hours       4-6 hours       <12 hours              Better with 1 tablet of Excedrin Migraine uses sparingly      B.  Past history with concussion after fall April 2016       Fall April 2016 positive loss of consciousness difficulty with vision after the event       Intermittent vertigo treated with meclizine in the past    C.   Chronic neck pain         No radicular component         No new bowel or bladder difficulty         No weakness in the arms or legs or ataxia        Past medical history  Migraine headaches  Hyperlipidemia  Chronic neck pain  Osteopenia  Insomnia  Small bowel obstruction September 2018  GERD/esophagitis  Status post total hysterectomy  Seasonal allergies  Concussion April 2016 positive loss of consciousness visual changes   Multiple surgeries 2018  1. Appendectomy, maybe in January 2018.  2. April 2018, right ovary removed.  3. July 2018, left ovary removed.  4. September 2018, hysterectomy, all done at the [facility], possibly with some cancer.        Habits  Does not smoke      Family history  Adopted  Does not know who her older sister is and she does not have migraines    Work-up review  EEG in the past normal.  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  TSH 2.21 and a B12 of 731.  Past HDL of 73, LDL of 91, sedimentation rate unremarkable in 2014 at 27, repeated was 28.  Video swallow 3/15/2023, no aspiration.  See official report.      Laboratory review                     1/18/2022        12/2022            3/15/2023   12/2023  NA/K             139/5.0           138/5.1                               141/4.5  BUN/Cr          17/0.81          17.4/0.8                              13.3/0.71  GLU                 92                 90                                      83  AST                 20                 21  WBC/HGB    9.3/13.8                                 10.8/13.4  PLTs            390,000                                    351,000  HDL/LDL                               82/87                                 82/59        Exam      Review of systems  Headaches as above    No diplopia dysarthria dysphagia  No visual changes    No focal weakness numbness or tingling  No ataxia  No bowel or bladder difficulty    Does have neck pain  No radicular component    No chest pain or shortness of breath no nausea vomiting diarrhea fever chills    Some vertigo see above    Otherwise review of systems good      General exam  Alert orient x3  Blood pressure 134/82, pulse 74  Lungs clear  Heart rate regular  Abdomen soft  Symmetrical pulses  No edema the feet    Neurologic exam  Alert orient x 3  Prosody of speech normal  Naming normal  Comprehension normal  Repetition normal  No aphasia  No neglect  Memory recall okay    Neuro degenerative changes  Subtle decreased blink  Slightly rigid posture  Mild bradykinesia  Can get up from the chair with her arms crossed  Ambulates okay  Has more of some action tremor      Cranial nerves II through XII normal  No ophthalmoplegia  No nystagmus  Visual fields intact  Face symmetrical  Tongue twisters good    Upper extremities  No drift  Subtle minimal action tremor    Lower extremities  Distal proximal strength good    Gait  Gets up from the chair with her arms crossed was a little bit difficult  Ambulates with normal gait srinivasan out to the sink and back turns are okay  Negative Romberg      Reflexes decreased throughout symmetrical  No spasticity      Assessment/plan    1.   Migraine headaches        Depakote 250 mg once at nighttime        Doing well        Discussed good headache hygiene        Abortive therapy Excedrin 1 tablet does not overuse    2.  Chronic neck pain       No myelopathic changes       Worse in the morning sometimes better after she gets up and moves around    3.   Posttraumatic vertigo flared up from the past        Meclizine half a tablet at nighttime if not improving,         Could receive physical therapy for cannula 3 positioning exercises        If still not better then should see ENT        Past history of concussion get some vestibular/visual therapy, chronic    4.  Cataract surgery had secondary film develop on her eyes and laser surgery fall 2021      Diagnosis  Migraine headaches  Chronic neck pain without myelopathic changes  Posttraumatic vertigo    Depakote 250 mg once per night for migraine prevention      Discussed multiple issues as above  Refilled medications  Discussed side effects and benefits of medication she prefers to stay with medication as she is doing better with that  Mild neuro degenerative changes noted as above    Follow-up in 1 year    Total care time today 30 minutes    As part of visit today  Reviewed laboratory data  Reviewed EMR notes

## 2024-03-04 ENCOUNTER — OFFICE VISIT (OUTPATIENT)
Dept: NEUROLOGY | Facility: CLINIC | Age: 79
End: 2024-03-04
Payer: MEDICARE

## 2024-03-04 VITALS
HEIGHT: 68 IN | BODY MASS INDEX: 26.83 KG/M2 | WEIGHT: 177 LBS | HEART RATE: 74 BPM | DIASTOLIC BLOOD PRESSURE: 82 MMHG | SYSTOLIC BLOOD PRESSURE: 134 MMHG

## 2024-03-04 DIAGNOSIS — H81.11 BENIGN PAROXYSMAL POSITIONAL VERTIGO OF RIGHT EAR: ICD-10-CM

## 2024-03-04 DIAGNOSIS — G43.109 MIGRAINE WITH AURA AND WITHOUT STATUS MIGRAINOSUS, NOT INTRACTABLE: Primary | ICD-10-CM

## 2024-03-04 PROCEDURE — 99214 OFFICE O/P EST MOD 30 MIN: CPT | Performed by: PSYCHIATRY & NEUROLOGY

## 2024-03-04 RX ORDER — SUMATRIPTAN 100 MG/1
100 TABLET, FILM COATED ORAL PRN
Qty: 9 TABLET | Refills: 11 | Status: SHIPPED | OUTPATIENT
Start: 2024-03-04

## 2024-03-04 RX ORDER — DIVALPROEX SODIUM 250 MG/1
250 TABLET, EXTENDED RELEASE ORAL AT BEDTIME
Qty: 90 TABLET | Refills: 3 | Status: SHIPPED | OUTPATIENT
Start: 2024-03-04

## 2024-03-04 NOTE — LETTER
3/4/2024         RE: Natalie Onofre  [address] [facility] [address]        Dear Colleague,    Thank you for referring your patient, Natalie Onofre, to the [facility] [place]. Please see a copy of my visit note below.    In person evaluation      HPI  2019, in person visit  2022, in person visit  3/3/2023, in person visit  3/4/2024, in person visit      78-year-old evaluated neurologically for:  Migraine headaches  Posttraumatic vertigo 2016    Since last seen a year ago  No hospitalizations  No surgeries  No major illnesses    Migraine headaches are under good control with low-dose Depakote  Has a little bit of intention tremor    May have had 1 rare migraines since I last saw her  Might of last half a day    Her Imitrex has  so she would like a refill    No significant vertigo which she had a little bit in the past  Since last seen doing well with migraine control      In the past  Had developed a mild vertigo  When she is walking sometimes she turns and kind of dips to 1 side a bit can do sidesteps  When she sits down she has this whoo sensation like she is in a swing last for less than a minute    No hearing loss  Once in a while some tinnitus  No recent barotrauma  No pain in the ear  No recent URI  No numbness on the face  No diplopia or dysarthria    Seems to have some flareup of her inner ear dysfunction/vertigo    She will try some over-the-counter meclizine half a tablet at nighttime  If after a week or 2 does she not seem to give it better she could contact the PT people that she has seen before for the positioning treatment  And if still not better should see ENT            A.  Migraine headaches       Been treated since 1994       Patient does have past history of motion sickness when she was young       History of neck pain and headaches prior to the severe ones that brought her in for diagnosis       Can be aggravated by some  insomnia and chronic neck pain                                  1/2022           3/2023         3/2024       Frequency     3/month        1/month         rare                                                                        1/year       Duration        3-4 hours      4-6 hours       <12 hours              Better with 1 tablet of Excedrin Migraine uses sparingly      B.  Past history with concussion after fall April 2016       Fall April 2016 positive loss of consciousness difficulty with vision after the event       Intermittent vertigo treated with meclizine in the past    C.   Chronic neck pain         No radicular component         No new bowel or bladder difficulty         No weakness in the arms or legs or ataxia        Past medical history  Migraine headaches  Hyperlipidemia  Chronic neck pain  Osteopenia  Insomnia  Small bowel obstruction September 2018  GERD/esophagitis  Status post total hysterectomy  Seasonal allergies  Concussion April 2016 positive loss of consciousness visual changes   Multiple surgeries 2018  1. Appendectomy, maybe in January 2018.  2. April 2018, right ovary removed.  3. July 2018, left ovary removed.  4. September 2018, hysterectomy, all done at the [facility], possibly with some cancer.        Habits  Does not smoke      Family history  Adopted  Does not know who her older sister is and she does not have migraines    Work-up review  EEG in the past normal.  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  TSH 2.21 and a B12 of 731.  Past HDL of 73, LDL of 91, sedimentation rate unremarkable in 2014 at 27, repeated was 28.  Video swallow 3/15/2023, no aspiration.  See official report.      Laboratory review                     1/18/2022        12/2022            3/15/2023   12/2023  NA/K             139/5.0           138/5.1                               141/4.5  BUN/Cr          17/0.81           17.4/0.8                              13.3/0.71  GLU                 92                 90                                      83  AST                 20                 21  WBC/HGB    9.3/13.8                                 10.8/13.4  PLTs            390,000                                   351,000  HDL/LDL                               82/87                                 82/59        Exam      Review of systems  Headaches as above    No diplopia dysarthria dysphagia  No visual changes    No focal weakness numbness or tingling  No ataxia  No bowel or bladder difficulty    Does have neck pain  No radicular component    No chest pain or shortness of breath no nausea vomiting diarrhea fever chills    Some vertigo see above    Otherwise review of systems good      General exam  Alert orient x3  Blood pressure 134/82, pulse 74  Lungs clear  Heart rate regular  Abdomen soft  Symmetrical pulses  No edema the feet    Neurologic exam  Alert orient x 3  Prosody of speech normal  Naming normal  Comprehension normal  Repetition normal  No aphasia  No neglect  Memory recall okay    Neuro degenerative changes  Subtle decreased blink  Slightly rigid posture  Mild bradykinesia  Can get up from the chair with her arms crossed  Ambulates okay  Has more of some action tremor      Cranial nerves II through XII normal  No ophthalmoplegia  No nystagmus  Visual fields intact  Face symmetrical  Tongue twisters good    Upper extremities  No drift  Subtle minimal action tremor    Lower extremities  Distal proximal strength good    Gait  Gets up from the chair with her arms crossed was a little bit difficult  Ambulates with normal gait srinivasan out to the sink and back turns are okay  Negative Romberg      Reflexes decreased throughout symmetrical  No spasticity      Assessment/plan    1.   Migraine headaches        Depakote 250 mg once at nighttime        Doing well        Discussed good headache hygiene        Abortive therapy Excedrin  1 tablet does not overuse    2.  Chronic neck pain       No myelopathic changes       Worse in the morning sometimes better after she gets up and moves around    3.   Posttraumatic vertigo flared up from the past        Meclizine half a tablet at nighttime if not improving,        Could receive physical therapy for cannula 3 positioning exercises        If still not better then should see ENT        Past history of concussion get some vestibular/visual therapy, chronic    4.  Cataract surgery had secondary film develop on her eyes and laser surgery fall 2021      Diagnosis  Migraine headaches  Chronic neck pain without myelopathic changes  Posttraumatic vertigo    Depakote 250 mg once per night for migraine prevention      Discussed multiple issues as above  Refilled medications  Discussed side effects and benefits of medication she prefers to stay with medication as she is doing better with that  Mild neuro degenerative changes noted as above    Follow-up in 1 year    Total care time today 30 minutes    As part of visit today  Reviewed laboratory data  Reviewed EMR notes                    Again, thank you for allowing me to participate in the care of your patient.        Sincerely,        juan Duran MD

## 2024-03-04 NOTE — NURSING NOTE
Chief Complaint   Patient presents with    Migraine     Annual follow up. Pt denies any migraines in the past year. Would like a refill of sumatriptan.     Shabana Arce LPN on 3/4/2024 at 1:22 PM

## 2024-12-18 ENCOUNTER — LAB REQUISITION (OUTPATIENT)
Dept: LAB | Facility: CLINIC | Age: 79
End: 2024-12-18
Payer: MEDICARE

## 2024-12-18 DIAGNOSIS — Z13.1 ENCOUNTER FOR SCREENING FOR DIABETES MELLITUS: ICD-10-CM

## 2024-12-18 DIAGNOSIS — E78.2 MIXED HYPERLIPIDEMIA: ICD-10-CM

## 2024-12-18 LAB
ANION GAP SERPL CALCULATED.3IONS-SCNC: 12 MMOL/L (ref 7–15)
BUN SERPL-MCNC: 16.7 MG/DL (ref 8–23)
CALCIUM SERPL-MCNC: 9.7 MG/DL (ref 8.8–10.4)
CHLORIDE SERPL-SCNC: 103 MMOL/L (ref 98–107)
CHOLEST SERPL-MCNC: 186 MG/DL
CREAT SERPL-MCNC: 0.87 MG/DL (ref 0.51–0.95)
EGFRCR SERPLBLD CKD-EPI 2021: 68 ML/MIN/1.73M2
FASTING STATUS PATIENT QL REPORTED: ABNORMAL
FASTING STATUS PATIENT QL REPORTED: NORMAL
GLUCOSE SERPL-MCNC: 100 MG/DL (ref 70–99)
HCO3 SERPL-SCNC: 26 MMOL/L (ref 22–29)
HDLC SERPL-MCNC: 89 MG/DL
LDLC SERPL CALC-MCNC: 71 MG/DL
NONHDLC SERPL-MCNC: 97 MG/DL
POTASSIUM SERPL-SCNC: 4.5 MMOL/L (ref 3.4–5.3)
SODIUM SERPL-SCNC: 141 MMOL/L (ref 135–145)
TRIGL SERPL-MCNC: 132 MG/DL

## 2024-12-18 PROCEDURE — 80061 LIPID PANEL: CPT | Mod: ORL | Performed by: FAMILY MEDICINE

## 2024-12-18 PROCEDURE — 80048 BASIC METABOLIC PNL TOTAL CA: CPT | Mod: ORL | Performed by: FAMILY MEDICINE
